# Patient Record
Sex: FEMALE | Race: WHITE | Employment: FULL TIME | URBAN - METROPOLITAN AREA
[De-identification: names, ages, dates, MRNs, and addresses within clinical notes are randomized per-mention and may not be internally consistent; named-entity substitution may affect disease eponyms.]

---

## 2024-01-09 ENCOUNTER — TELEPHONE (OUTPATIENT)
Age: 44
End: 2024-01-09

## 2024-01-09 NOTE — TELEPHONE ENCOUNTER
[facility] called to schedule an appointment with Clare referred by Murray Brooke MD       Please call: [phone]

## 2024-05-02 NOTE — PROGRESS NOTES
[facility]  [address] [facility], [facility], [address] [address]  Office:  [phone]  Fax: [phone]                  Initial Office Exam  Patient Name: Sienna Mckoy  Age: 44 y.o.  Gender: female   Handedness: right handed   Presenting Concern: memory loss; anxiety and depression  Primary Care Physician: Golden Perez PA-C  Referring Provider:Murray Brooke MD       REASON FOR REFERRAL:  This comprehensive and medically necessary neuropsychological assessment was requested to assist a differential diagnosis of cognitive and psychological concerns.  The use and purpose of this examination, as well as the extent and limitations of confidentiality, were explained prior to obtaining permission to participate.  Instructions were provided regarding the necessity to put forth optimal effort and answer questions truthfully in order to obtain reliable and accurate test results.    REVIEW OF RECORDS:  Ms. Mckoy was referred by neurology where she is followed for memory loss. Wellbutrin is prescribed. According to records, memory loss first emerged several years ago and have become more pronounced in the last eight months. She has reported forgetting dates, names, and how to spell words. She recently forgot her social security number. No functional impairment with ADLs is noted. Ability to cook and manage household finances remains in tact. Daytime fatigue has been reported as has anxiety, depression, and stress.     A sleep study and EEG have been ordered.     There is no family history of dementia.     According to records provided by the referring provider, an MRI was unremarkable.     No current outpatient medications on file.     No current facility-administered medications for this visit.          No past medical history on file.  Failed to redirect to the Timeline version of the [employer].    No data recorded          No data to display                 No past surgical history

## 2024-05-06 ENCOUNTER — TELEMEDICINE (OUTPATIENT)
Age: 44
End: 2024-05-06
Payer: OTHER GOVERNMENT

## 2024-05-06 DIAGNOSIS — R41.3 MEMORY LOSS: Primary | ICD-10-CM

## 2024-05-06 DIAGNOSIS — Z91.49 HISTORY OF PSYCHOLOGICAL TRAUMA: ICD-10-CM

## 2024-05-06 DIAGNOSIS — F41.0 PANIC ATTACKS: ICD-10-CM

## 2024-05-06 DIAGNOSIS — R45.89 DEPRESSED MOOD: ICD-10-CM

## 2024-05-06 DIAGNOSIS — F41.9 ANXIETY: ICD-10-CM

## 2024-05-06 PROCEDURE — 90791 PSYCH DIAGNOSTIC EVALUATION: CPT | Performed by: PSYCHOLOGIST

## 2024-05-20 ENCOUNTER — PROCEDURE VISIT (OUTPATIENT)
Age: 44
End: 2024-05-20
Payer: OTHER GOVERNMENT

## 2024-05-20 DIAGNOSIS — F41.0 PANIC ATTACKS: ICD-10-CM

## 2024-05-20 DIAGNOSIS — F41.8 ANXIETY WITH SOMATIC FEATURES: ICD-10-CM

## 2024-05-20 DIAGNOSIS — F33.1 MODERATE EPISODE OF RECURRENT MAJOR DEPRESSIVE DISORDER (HCC): ICD-10-CM

## 2024-05-20 DIAGNOSIS — F90.0 ATTENTION DEFICIT HYPERACTIVITY DISORDER (ADHD), PREDOMINANTLY INATTENTIVE TYPE: Primary | ICD-10-CM

## 2024-05-20 DIAGNOSIS — F43.21 COMPLICATED GRIEF: ICD-10-CM

## 2024-05-20 DIAGNOSIS — F41.9 ANXIETY DISORDER, UNSPECIFIED TYPE: ICD-10-CM

## 2024-05-20 DIAGNOSIS — F43.9 TRAUMA AND STRESSOR-RELATED DISORDER: ICD-10-CM

## 2024-05-20 PROCEDURE — 96136 PSYCL/NRPSYC TST PHY/QHP 1ST: CPT | Performed by: PSYCHOLOGIST

## 2024-05-20 PROCEDURE — 96137 PSYCL/NRPSYC TST PHY/QHP EA: CPT | Performed by: PSYCHOLOGIST

## 2024-05-20 PROCEDURE — 96138 PSYCL/NRPSYC TECH 1ST: CPT | Performed by: PSYCHOLOGIST

## 2024-05-20 PROCEDURE — 96132 NRPSYC TST EVAL PHYS/QHP 1ST: CPT | Performed by: PSYCHOLOGIST

## 2024-05-20 PROCEDURE — 96139 PSYCL/NRPSYC TST TECH EA: CPT | Performed by: PSYCHOLOGIST

## 2024-05-20 PROCEDURE — 96133 NRPSYC TST EVAL PHYS/QHP EA: CPT | Performed by: PSYCHOLOGIST

## 2024-05-20 NOTE — PROGRESS NOTES
[facility]  [address] [facility], [facility], [address] [address]  Office:  [phone]  Fax: [phone]                  Neuropsychological Evaluation Report    Patient Name: Sienna Mckoy  Age: 44 y.o.  Gender: female   Handedness: right handed   Presenting Concern: memory loss; anxiety and depression  Primary Care Physician: Golden Perez PA-C  Referring Provider:Murray Brooke MD     PATIENT HISTORY (OBTAINED DURING INITIAL CLINICAL EVALUATION):      REASON FOR REFERRAL:  This comprehensive and medically necessary neuropsychological assessment was requested to assist a differential diagnosis of cognitive and psychological concerns.  The use and purpose of this examination, as well as the extent and limitations of confidentiality, were explained prior to obtaining permission to participate.  Instructions were provided regarding the necessity to put forth optimal effort and answer questions truthfully in order to obtain reliable and accurate test results.    REVIEW OF RECORDS:  Ms. Mckoy was referred by neurology where she is followed for memory loss. Wellbutrin is prescribed. According to records, memory loss first emerged several years ago and has become more pronounced in the last eight months. She has reported forgetting dates, names, and how to spell words. She recently forgot her social security number. No functional impairment with ADLs is noted. Ability to cook and manage household finances remains in tact. Daytime fatigue has been reported as has anxiety, depression, and stress.     A sleep study and EEG have been ordered.     There is no family history of dementia.     An MRI on 10/11/23, ordered for amnesia, headaches, and memory loss was considered normal for age.     No current outpatient medications on file.     No current facility-administered medications for this visit.          No past medical history on file.  Failed to redirect to the Timeline version of the REVFS

## 2024-05-28 NOTE — PROGRESS NOTES
state where the patient is located as indicated above. If you are not or unsure, please re-schedule the visit: Yes, I confirm.

## 2024-06-03 ENCOUNTER — TELEMEDICINE (OUTPATIENT)
Age: 44
End: 2024-06-03
Payer: OTHER GOVERNMENT

## 2024-06-03 DIAGNOSIS — F41.8 ANXIETY WITH SOMATIC FEATURES: ICD-10-CM

## 2024-06-03 DIAGNOSIS — F43.9 TRAUMA AND STRESSOR-RELATED DISORDER: ICD-10-CM

## 2024-06-03 DIAGNOSIS — F43.21 COMPLICATED GRIEF: ICD-10-CM

## 2024-06-03 DIAGNOSIS — F41.0 PANIC ATTACKS: ICD-10-CM

## 2024-06-03 DIAGNOSIS — F90.0 ATTENTION DEFICIT HYPERACTIVITY DISORDER (ADHD), PREDOMINANTLY INATTENTIVE TYPE: Primary | ICD-10-CM

## 2024-06-03 DIAGNOSIS — F33.1 MODERATE EPISODE OF RECURRENT MAJOR DEPRESSIVE DISORDER (HCC): ICD-10-CM

## 2024-06-03 DIAGNOSIS — F41.9 ANXIETY DISORDER, UNSPECIFIED TYPE: ICD-10-CM

## 2024-06-03 PROCEDURE — 90832 PSYTX W PT 30 MINUTES: CPT | Performed by: PSYCHOLOGIST
